# Patient Record
Sex: FEMALE | Race: WHITE | NOT HISPANIC OR LATINO | Employment: STUDENT | URBAN - METROPOLITAN AREA
[De-identification: names, ages, dates, MRNs, and addresses within clinical notes are randomized per-mention and may not be internally consistent; named-entity substitution may affect disease eponyms.]

---

## 2024-10-08 ENCOUNTER — OFFICE VISIT (OUTPATIENT)
Dept: OBSTETRICS AND GYNECOLOGY | Facility: CLINIC | Age: 29
End: 2024-10-08
Payer: COMMERCIAL

## 2024-10-08 VITALS — RESPIRATION RATE: 18 BRPM | BODY MASS INDEX: 23.27 KG/M2 | HEIGHT: 63 IN | WEIGHT: 131.31 LBS

## 2024-10-08 DIAGNOSIS — Z01.419 ENCOUNTER FOR ANNUAL ROUTINE GYNECOLOGICAL EXAMINATION: Primary | ICD-10-CM

## 2024-10-08 DIAGNOSIS — Z30.41 ORAL CONTRACEPTIVE PILL SURVEILLANCE: ICD-10-CM

## 2024-10-08 PROCEDURE — 99999 PR PBB SHADOW E&M-EST. PATIENT-LVL III: CPT | Mod: PBBFAC,,, | Performed by: OBSTETRICS & GYNECOLOGY

## 2024-10-08 PROCEDURE — 3008F BODY MASS INDEX DOCD: CPT | Mod: CPTII,S$GLB,, | Performed by: OBSTETRICS & GYNECOLOGY

## 2024-10-08 PROCEDURE — 1160F RVW MEDS BY RX/DR IN RCRD: CPT | Mod: CPTII,S$GLB,, | Performed by: OBSTETRICS & GYNECOLOGY

## 2024-10-08 PROCEDURE — 3044F HG A1C LEVEL LT 7.0%: CPT | Mod: CPTII,S$GLB,, | Performed by: OBSTETRICS & GYNECOLOGY

## 2024-10-08 PROCEDURE — 99395 PREV VISIT EST AGE 18-39: CPT | Mod: S$GLB,,, | Performed by: OBSTETRICS & GYNECOLOGY

## 2024-10-08 PROCEDURE — 1159F MED LIST DOCD IN RCRD: CPT | Mod: CPTII,S$GLB,, | Performed by: OBSTETRICS & GYNECOLOGY

## 2024-10-08 RX ORDER — NORGESTIMATE AND ETHINYL ESTRADIOL 7DAYSX3 LO
1 KIT ORAL DAILY
Qty: 84 TABLET | Refills: 3 | Status: SHIPPED | OUTPATIENT
Start: 2024-10-08

## 2024-10-08 NOTE — PROGRESS NOTES
"Chief Complaint: Well Woman Exam     HPI:      Maria Dolores Hyde is a 29 y.o.  who presents today for well woman exam.  LMP: Patient's last menstrual period was 2024 (exact date).  No issues, problems, or complaints. Specifically, patient denies abnormal vaginal bleeding, discharge, pelvic pain, urinary problems, or changes in appetite. Ms. Hyde is currently sexually active with a single male partner. She is currently using oral contraceptives (estrogen/progesterone) for contraception. She declines STD screening today.    Previous Pap: NILM (2022)    Gardasil:Completed     Ms. Hyde confirms that she wears her seatbelt when riding in the car and does not text while driving.     OB History          0    Para   0    Term   0       0    AB   0    Living   0         SAB   0    IAB   0    Ectopic   0    Multiple   0    Live Births   0               ROS:     GENERAL: Denies unintentional weight gain or weight loss. Feeling well overall.   SKIN: Denies rash or lesions.   HEENT: Denies headaches, or vision changes.   CARDIOVASCULAR: Denies palpitations or chest pain.   RESPIRATORY: Denies shortness of breath or dyspnea on exertion.  BREASTS: Denies lumps or nipple discharge.   ABDOMEN: Denies constipation, diarrhea, nausea, vomiting, change in appetite.  URINARY: Denies frequency, dysuria.  NEUROLOGIC: Denies syncope or weakness.   PSYCHIATRIC: Denies uncontrolled depression or anxiety.    Physical Exam:      PHYSICAL EXAM:  Resp 18   Ht 5' 3" (1.6 m)   Wt 59.5 kg (131 lb 4.5 oz)   LMP 2024 (Exact Date)   BMI 23.26 kg/m²   Body mass index is 23.26 kg/m².     APPEARANCE: Well nourished, well developed, in no acute distress.  PSYCH: Appropriate mood and affect.  SKIN: No acne or hirsutism  NECK: Neck symmetric without masses  NODES: No inguinal, axillary, or supraclavicular lymph node enlargement  ABDOMEN: Soft.  No tenderness or masses.    CARDIOVASCULAR: No edema of peripheral " extremities  BREASTS: Symmetrical, no visible skin lesions. No palpable masses. No nipple discharge bilaterally.  PELVIC: Normal external genitalia without lesions.  Normal hair distribution.  Adequate perineal body, normal urethral meatus.  Vagina moist and well rugated without lesions or discharge.  Cervix pink, without lesions, discharge or tenderness.  No significant cystocele or rectocele.  Bimanual exam shows uterus to be normal size, regular, mobile and nontender.  Adnexa without masses or tenderness.      Female chaperone present for breast and pelvic exam.     Assessment/Plan:     Encounter for annual routine gynecological examination    Oral contraceptive pill surveillance  -     norgestimate-ethinyl estradioL (TRI-LO-GISELA) 0.18/0.215/0.25 mg-25 mcg tablet; Take 1 tablet by mouth once daily.  Dispense: 84 tablet; Refill: 3      Follow up in about 1 year (around 10/8/2025) for Annual Exam.    Counseling:     Patient was counseled today on current ASCCP pap guidelines, the recommendation for yearly physical exams, safe driving habits, breast self awareness. She is to see her PCP for other health maintenance.     Use of the [facility] Patient Portal discussed and encouraged during today's visit.

## 2024-10-21 ENCOUNTER — OFFICE VISIT (OUTPATIENT)
Dept: DERMATOLOGY | Facility: CLINIC | Age: 29
End: 2024-10-21
Payer: COMMERCIAL

## 2024-10-21 DIAGNOSIS — L81.4 LENTIGO: ICD-10-CM

## 2024-10-21 DIAGNOSIS — D18.01 CHERRY ANGIOMA: ICD-10-CM

## 2024-10-21 DIAGNOSIS — D22.9 NEVUS: Primary | ICD-10-CM

## 2024-10-21 PROCEDURE — 1160F RVW MEDS BY RX/DR IN RCRD: CPT | Mod: CPTII,S$GLB,, | Performed by: DERMATOLOGY

## 2024-10-21 PROCEDURE — 99999 PR PBB SHADOW E&M-EST. PATIENT-LVL III: CPT | Mod: PBBFAC,,, | Performed by: DERMATOLOGY

## 2024-10-21 PROCEDURE — 1159F MED LIST DOCD IN RCRD: CPT | Mod: CPTII,S$GLB,, | Performed by: DERMATOLOGY

## 2024-10-21 PROCEDURE — 99203 OFFICE O/P NEW LOW 30 MIN: CPT | Mod: S$GLB,,, | Performed by: DERMATOLOGY

## 2024-10-21 PROCEDURE — 3044F HG A1C LEVEL LT 7.0%: CPT | Mod: CPTII,S$GLB,, | Performed by: DERMATOLOGY

## 2024-11-18 ENCOUNTER — PATIENT MESSAGE (OUTPATIENT)
Dept: PRIMARY CARE CLINIC | Facility: CLINIC | Age: 29
End: 2024-11-18
Payer: COMMERCIAL

## 2024-11-18 ENCOUNTER — E-VISIT (OUTPATIENT)
Dept: FAMILY MEDICINE | Facility: CLINIC | Age: 29
End: 2024-11-18
Payer: COMMERCIAL

## 2024-11-18 DIAGNOSIS — J02.9 SORE THROAT: ICD-10-CM

## 2024-11-18 DIAGNOSIS — B09 VIRAL EXANTHEM: Primary | ICD-10-CM

## 2024-11-18 RX ORDER — NAPROXEN 500 MG/1
500 TABLET ORAL 2 TIMES DAILY PRN
Qty: 14 TABLET | Refills: 0 | Status: SHIPPED | OUTPATIENT
Start: 2024-11-18 | End: 2024-11-19

## 2024-11-18 RX ORDER — METHYLPREDNISOLONE 4 MG/1
TABLET ORAL
Qty: 21 TABLET | Refills: 0 | Status: SHIPPED | OUTPATIENT
Start: 2024-11-18 | End: 2024-11-19

## 2024-11-18 NOTE — PROGRESS NOTES
Patient ID: Maria Dolores Hyde is a 29 y.o. female.    Chief Complaint: General Illness (Entered automatically based on patient selection in [employer].)          274}  The patient initiated a request through [employer] on 11/18/2024 for evaluation and management with a chief complaint of General Illness (Entered automatically based on patient selection in [employer].)     I evaluated the questionnaire submission on 11/18/2024 .    Total Time (in minutes): 11     [facility] P[facility] Evisit [facility]group-Common Problems    11/18/2024  2:18 PM CST - Filed by Patient   What do you need help with? Other Concern   Do you agree to participate in an E-Visit? Yes   If you have any of the following symptoms, please present to your local emergency room or call 911:  I acknowledge   Select all that apply: None of the above   What is the main issue you would like addressed today? small dots on my fingers, palms and one on my throat   Please describe your symptoms throat feels sore and dots on my hands feel sensitive. have a few on my feet as well   Where is your problem located? hands, feet, throat   How severe are your symptoms? Mild   Have you had these symptoms before? No   How long have you been having these symptoms? For a few days   Please list any medications or treatments you have used for your condition and indicate if it was effective or not. i took two Aleve last night and it did not do anything.   What makes this feel better? nothing   What makes this feel worse? hot water on my skin slightly stings   Are these symptoms related to a condition that you currently have? No   Please describe any probable cause for these symptoms i was with my niece 8 days ago. about 13 days ago her  sent the kids home because someone had hand, foot and mouth. my niece went to her pediatrician and did not have the viral infection.   Provide any additional information you feel is important.    Please attach any relevant images or files    Are you able  to take your vital signs? No          Active Problem List with Overview Notes    Diagnosis Date Noted    Anxiety 08/03/2017     Lexapro 10mg, due for refills    Stable on medications, continue regimen        Migraine headache 07/21/2012     last 2010        Recent Labs Obtained:  Lab Results   Component Value Date    WBC 7.18 01/20/2024    HGB 13.3 01/20/2024    HCT 39.3 01/20/2024    MCV 91 01/20/2024     01/20/2024     01/20/2024    K 4.1 01/20/2024    GLU 88 01/20/2024    CREATININE 0.8 01/20/2024    EGFRNORACEVR >60.0 01/20/2024    HGBA1C 4.8 01/20/2024    TSH 0.969 01/20/2024      Review of patient's allergies indicates:  No Known Allergies    Encounter Diagnoses   Name Primary?    Viral exanthem Yes    Sore throat         No orders of the defined types were placed in this encounter.     Medications Ordered This Encounter   Medications    methylPREDNISolone (MEDROL DOSEPACK) 4 mg tablet     Sig: follow package directions     Dispense:  21 tablet     Refill:  0    naproxen (NAPROSYN) 500 MG tablet     Sig: Take 1 tablet (500 mg total) by mouth 2 (two) times daily as needed (pain).     Dispense:  14 tablet     Refill:  0        E-Visit Time Tracking:    Day 1 Time (in minutes): 11    Total Time (in minutes): 11      274}

## 2024-11-19 RX ORDER — NAPROXEN 500 MG/1
500 TABLET ORAL 2 TIMES DAILY PRN
Qty: 14 TABLET | Refills: 0 | Status: SHIPPED | OUTPATIENT
Start: 2024-11-19 | End: 2024-11-26

## 2024-11-19 RX ORDER — METHYLPREDNISOLONE 4 MG/1
TABLET ORAL
Qty: 21 TABLET | Refills: 0 | Status: SHIPPED | OUTPATIENT
Start: 2024-11-19

## 2025-02-17 ENCOUNTER — PATIENT MESSAGE (OUTPATIENT)
Dept: PRIMARY CARE CLINIC | Facility: CLINIC | Age: 30
End: 2025-02-17
Payer: COMMERCIAL

## 2025-02-25 DIAGNOSIS — F41.9 ANXIETY: ICD-10-CM

## 2025-02-26 RX ORDER — ESCITALOPRAM OXALATE 10 MG/1
10 TABLET ORAL
Qty: 90 TABLET | Refills: 0 | Status: SHIPPED | OUTPATIENT
Start: 2025-02-26

## 2025-02-26 NOTE — TELEPHONE ENCOUNTER
Refill Decision Note   Maria Dolores Hyde  is requesting a refill authorization.  Brief Assessment and Rationale for Refill:  Approve     Medication Therapy Plan:         Comments:     Note composed:12:06 AM 02/26/2025

## 2025-02-26 NOTE — TELEPHONE ENCOUNTER
No care due was identified.  [facility] Embedded Care Due Messages. Reference number: [id number].   2/25/2025 11:10:49 PM CST

## 2025-03-27 ENCOUNTER — PATIENT MESSAGE (OUTPATIENT)
Dept: PRIMARY CARE CLINIC | Facility: CLINIC | Age: 30
End: 2025-03-27

## 2025-03-27 ENCOUNTER — OFFICE VISIT (OUTPATIENT)
Dept: PRIMARY CARE CLINIC | Facility: CLINIC | Age: 30
End: 2025-03-27
Payer: COMMERCIAL

## 2025-03-27 VITALS
SYSTOLIC BLOOD PRESSURE: 110 MMHG | WEIGHT: 135.81 LBS | BODY MASS INDEX: 24.06 KG/M2 | HEART RATE: 58 BPM | DIASTOLIC BLOOD PRESSURE: 60 MMHG | OXYGEN SATURATION: 98 %

## 2025-03-27 DIAGNOSIS — Z13.1 SCREENING FOR DIABETES MELLITUS: ICD-10-CM

## 2025-03-27 DIAGNOSIS — F41.9 ANXIETY: ICD-10-CM

## 2025-03-27 DIAGNOSIS — Z00.00 ANNUAL PHYSICAL EXAM: Primary | ICD-10-CM

## 2025-03-27 PROCEDURE — 99999 PR PBB SHADOW E&M-EST. PATIENT-LVL III: CPT | Mod: PBBFAC,,, | Performed by: STUDENT IN AN ORGANIZED HEALTH CARE EDUCATION/TRAINING PROGRAM

## 2025-03-27 PROCEDURE — 3074F SYST BP LT 130 MM HG: CPT | Mod: CPTII,S$GLB,, | Performed by: STUDENT IN AN ORGANIZED HEALTH CARE EDUCATION/TRAINING PROGRAM

## 2025-03-27 PROCEDURE — 1160F RVW MEDS BY RX/DR IN RCRD: CPT | Mod: CPTII,S$GLB,, | Performed by: STUDENT IN AN ORGANIZED HEALTH CARE EDUCATION/TRAINING PROGRAM

## 2025-03-27 PROCEDURE — 99395 PREV VISIT EST AGE 18-39: CPT | Mod: S$GLB,,, | Performed by: STUDENT IN AN ORGANIZED HEALTH CARE EDUCATION/TRAINING PROGRAM

## 2025-03-27 PROCEDURE — 3078F DIAST BP <80 MM HG: CPT | Mod: CPTII,S$GLB,, | Performed by: STUDENT IN AN ORGANIZED HEALTH CARE EDUCATION/TRAINING PROGRAM

## 2025-03-27 PROCEDURE — 3008F BODY MASS INDEX DOCD: CPT | Mod: CPTII,S$GLB,, | Performed by: STUDENT IN AN ORGANIZED HEALTH CARE EDUCATION/TRAINING PROGRAM

## 2025-03-27 PROCEDURE — 1159F MED LIST DOCD IN RCRD: CPT | Mod: CPTII,S$GLB,, | Performed by: STUDENT IN AN ORGANIZED HEALTH CARE EDUCATION/TRAINING PROGRAM

## 2025-03-27 NOTE — PROGRESS NOTES
SUBJECTIVE     Chief Complaint   Patient presents with    Annual Exam       HPI  Maria Dolores Hyde is a very pleasant 29 y.o. female with medical diagnoses as listed in the medical history and problem list that presents for annual exam. Pt has been doing well since our last appointment.    Pt is UTD on age appropriate CA screening. Pap is UTD.    Family, social, surgical Hx reviewed     Health Maintenance         Date Due Completion Date    COVID-19 Vaccine (3 - Booster for Moderna series) 07/23/2021 5/28/2021    Influenza Vaccine (1) 09/01/2022 12/27/2018    TETANUS VACCINE 07/15/2024 7/15/2014    Pap Smear 08/26/2025 8/26/2022                Generalized anxiety disorder:  -Lexapro 10 mg daily.  -Denies SI/HI/AVH  -Tolerating medication without issues  -Denies current symptoms of feeling nervous, excess worry, difficulty relaxing, restlessness, irritability, or fear of something bad happening.      PAST MEDICAL HISTORY:  Past Medical History:   Diagnosis Date    Anxiety 2017    Headache(784.0)     migraines    Oral contraceptive use        PAST SURGICAL HISTORY:  History reviewed. No pertinent surgical history.    SOCIAL HISTORY:  Social History     Socioeconomic History    Marital status:     Number of children: 0   Occupational History    Occupation: Student     Comment: [employer] 2019   Tobacco Use    Smoking status: Never    Smokeless tobacco: Never   Substance and Sexual Activity    Alcohol use: Yes     Alcohol/week: 2.0 standard drinks of alcohol     Types: 2 Glasses of wine per week    Drug use: Never    Sexual activity: Yes     Partners: Male     Birth control/protection: OCP   Social History Narrative    ** Merged History Encounter **         No exercise.  Makes straight A's.  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     Social Drivers of Health     Financial Resource Strain: Low Risk  (10/18/2024)    Overall Financial Resource Strain (CARDIA)     Difficulty of Paying Living Expenses: Not very hard   Food  Insecurity: No Food Insecurity (10/18/2024)    Hunger Vital Sign     Worried About Running Out of Food in the Last Year: Never true     Ran Out of Food in the Last Year: Never true   Physical Activity: Sufficiently Active (10/18/2024)    Exercise Vital Sign     Days of Exercise per Week: 5 days     Minutes of Exercise per Session: 60 min   Stress: No Stress Concern Present (10/18/2024)    [place] [employer] - Occupational Stress Questionnaire     Feeling of Stress : Not at all   Housing Stability: Unknown (10/18/2024)    Housing Stability Vital Sign     Unable to Pay for Housing in the Last Year: No       FAMILY HISTORY:  Family History   Problem Relation Name Age of Onset    Breast cancer Maternal Grandmother Helen Hughes     Arthritis Maternal Grandmother Helen Hughes     Cancer Maternal Grandmother Helen Hughes     Skin cancer Maternal Grandfather      Depression Mother Nanda Guillermo     Miscarriages / Stillbirths Mother Nanda Guillermo     Tremor Father      Depression Father      Migraines Sister Chey Ricardo     Melanoma Neg Hx      Colon cancer Neg Hx      Ovarian cancer Neg Hx         ALLERGIES AND MEDICATIONS: updated and reviewed.  Review of patient's allergies indicates:  No Known Allergies  Current Outpatient Medications   Medication Sig Dispense Refill    cyanocobalamin 500 MCG tablet Take 500 mcg by mouth once daily.      EScitalopram oxalate (LEXAPRO) 10 MG tablet TAKE 1 TABLET DAILY 90 tablet 0    methylPREDNISolone (MEDROL DOSEPACK) 4 mg tablet follow package directions 21 tablet 0    norgestimate-ethinyl estradioL (TRI-LO-GISELA) 0.18/0.215/0.25 mg-25 mcg tablet Take 1 tablet by mouth once daily. 84 tablet 3     No current facility-administered medications for this visit.       ROS  Review of Systems   Constitutional:  Negative for fever and weight loss.   Respiratory:  Negative for cough and shortness of breath.    Cardiovascular:  Negative for chest pain and  palpitations.   Gastrointestinal:  Negative for abdominal pain, constipation, diarrhea, nausea and vomiting.   Genitourinary:  Negative for dysuria.   Musculoskeletal:  Negative for back pain and joint pain.   Skin:  Negative for rash.   Neurological:  Negative for dizziness, weakness and headaches.   Psychiatric/Behavioral:  Negative for depression. The patient is not nervous/anxious.          OBJECTIVE     Physical Exam  Vitals:    03/27/25 0817   BP: 110/60   Pulse: (!) 58    Body mass index is 24.06 kg/m².  Weight: 61.6 kg (135 lb 12.9 oz)         Physical Exam  HENT:      Head: Normocephalic and atraumatic.      Nose: Nose normal.      Mouth/Throat:      Mouth: Mucous membranes are moist.      Pharynx: Oropharynx is clear.   Eyes:      Extraocular Movements: Extraocular movements intact.      Conjunctiva/sclera: Conjunctivae normal.      Pupils: Pupils are equal, round, and reactive to light.   Cardiovascular:      Rate and Rhythm: Normal rate and regular rhythm.   Pulmonary:      Effort: Pulmonary effort is normal.      Breath sounds: Normal breath sounds.   Abdominal:      General: There is no distension.      Palpations: Abdomen is soft.      Tenderness: There is no abdominal tenderness.   Musculoskeletal:         General: No swelling. Normal range of motion.      Cervical back: Normal range of motion.      Right lower leg: No edema.      Left lower leg: No edema.   Skin:     General: Skin is warm.      Findings: No lesion or rash.   Neurological:      General: No focal deficit present.      Mental Status: She is alert and oriented to person, place, and time.      Motor: No weakness.   Psychiatric:         Mood and Affect: Mood normal.         Thought Content: Thought content normal.               ASSESSMENT     29 y.o. female with     1. Annual physical exam    2. Screening for diabetes mellitus    3. Anxiety        PLAN:     1. Annual physical exam  -     TSH; Future; Expected date: 03/27/2025  -     Lipid  Panel; Future; Expected date: 03/27/2025  -     Comprehensive Metabolic Panel; Future; Expected date: 03/27/2025  -     CBC Auto Differential; Future; Expected date: 03/27/2025    2. Screening for diabetes mellitus  -     Hemoglobin A1C; Future; Expected date: 03/27/2025    3. Anxiety  Overview:  Lexapro 10mg, due for refills    Stable on medications, continue regimen                Discussed age and gender appropriate screenings at this visit and encouraged a healthy diet low in simple carbohydrates, and increased physical activity.  Counseled on medically appropriate vaccines based on age and current health condition.  Screening test reviewed and discussed with patient.      RTC in 1 year     Lamar Neri MD  03/27/2025 2:25 PM

## 2025-05-26 DIAGNOSIS — F41.9 ANXIETY: ICD-10-CM

## 2025-05-27 RX ORDER — ESCITALOPRAM OXALATE 10 MG/1
10 TABLET ORAL
Qty: 90 TABLET | Refills: 3 | Status: SHIPPED | OUTPATIENT
Start: 2025-05-27

## 2025-05-27 NOTE — TELEPHONE ENCOUNTER
No care due was identified.  [facility] Embedded Care Due Messages. Reference number: [id number].   5/26/2025 11:13:38 PM CDT

## 2025-05-27 NOTE — TELEPHONE ENCOUNTER
Refill Decision Note   Maria Dolores Hyde  is requesting a refill authorization.  Brief Assessment and Rationale for Refill:  Approve     Medication Therapy Plan:         Comments:     Note composed:4:45 AM 05/27/2025

## 2025-06-16 ENCOUNTER — PATIENT MESSAGE (OUTPATIENT)
Dept: ADMINISTRATIVE | Facility: HOSPITAL | Age: 30
End: 2025-06-16
Payer: COMMERCIAL